# Patient Record
(demographics unavailable — no encounter records)

---

## 2025-05-08 NOTE — HISTORY OF PRESENT ILLNESS
[de-identified] : S/P ORIF R femoral neck fracture 2/13/25 [de-identified] : S/P ORIF R femoral neck fracture for impacted femoral neck fracture on 2/13/25. He is weightbearing with a cane.  He is overall feeling better but he continues to have difficulty with some motions such as hip flexion.  He is not participating in therapy.  He states his leg feels weak [de-identified] : Physical exam of the R Hip:  There is a well healed surgical incision with no erythema or drainage. There are no signs of infection and normal post operative edema with no ecchymosis.  110 degrees and hip flexion,   40 degrees Abduction, 0 degrees adduction  30 degrees external rotation,  30 degrees internal rotation.    There is a normal neurovascular exam with 2+ distal pulses [de-identified] : 2 views of the R hip taken today and reviewed by me show fully healed R femoral neck fracture with all hardware in good position and no change in position or alignment compared to previous images  [de-identified] : S/P ORIF R femoral neck fracture 2/13/25  [de-identified] : His femoral neck is fully healednicely there is been no interval change in position or alignment of the fracture.  There is minor varus malunion.  The patient was reminded that arthroplasty was recommended however he refused and wished to proceed with percutaneous fixation.  He is encouraged to participate in physical therapy, but he is reluctant.  Total hip arthroplasty is offered as a way to correct his hip deformity as well as the leg length discrepancy.  He will follow-up in 2 months